# Patient Record
Sex: FEMALE | Race: WHITE | URBAN - NONMETROPOLITAN AREA
[De-identification: names, ages, dates, MRNs, and addresses within clinical notes are randomized per-mention and may not be internally consistent; named-entity substitution may affect disease eponyms.]

---

## 2021-09-08 ENCOUNTER — WEB ENCOUNTER (OUTPATIENT)
Dept: URBAN - NONMETROPOLITAN AREA CLINIC 13 | Facility: CLINIC | Age: 26
End: 2021-09-08

## 2021-09-08 ENCOUNTER — LAB OUTSIDE AN ENCOUNTER (OUTPATIENT)
Dept: URBAN - NONMETROPOLITAN AREA CLINIC 13 | Facility: CLINIC | Age: 26
End: 2021-09-08

## 2021-09-08 ENCOUNTER — OFFICE VISIT (OUTPATIENT)
Dept: URBAN - NONMETROPOLITAN AREA CLINIC 13 | Facility: CLINIC | Age: 26
End: 2021-09-08
Payer: COMMERCIAL

## 2021-09-08 DIAGNOSIS — R19.7 ACUTE DIARRHEA: ICD-10-CM

## 2021-09-08 DIAGNOSIS — L29.9 ITCHING: ICD-10-CM

## 2021-09-08 DIAGNOSIS — R10.84 DIFFUSE ABDOMINAL PAIN: ICD-10-CM

## 2021-09-08 PROCEDURE — 99244 OFF/OP CNSLTJ NEW/EST MOD 40: CPT | Performed by: INTERNAL MEDICINE

## 2021-09-08 RX ORDER — LEVOTHYROXINE SODIUM 200 UG/1
1 TABLET IN THE MORNING ON AN EMPTY STOMACH TABLET ORAL ONCE A DAY
Status: ACTIVE | COMMUNITY

## 2021-09-08 RX ORDER — SACCHAROMYCES BOULARDII 50 MG
1 CAPSULE CAPSULE ORAL TWICE A DAY
Qty: 60 | OUTPATIENT
Start: 2021-09-08 | End: 2021-10-08

## 2021-09-08 RX ORDER — IBUPROFEN 800 MG/1
TAKE 1 TABLET BY MOUTH TWICE DAILY AS NEEDED FOR PAIN TAKE WITH FOOD TABLET, FILM COATED ORAL
Qty: 30 | Refills: 2 | Status: ACTIVE | COMMUNITY

## 2021-09-08 RX ORDER — CETIRIZINE HYDROCHLORIDE 10 MG/1
TAKE 1 TABLET BY MOUTH ONCE DAILY TABLET ORAL
Qty: 30 | Refills: 0 | Status: ACTIVE | COMMUNITY

## 2021-09-08 RX ORDER — DICYCLOMINE HYDROCHLORIDE 10 MG/1
1 CAPSULE BEFORE MEALS PRN ABDOMINAL PAIN CAPSULE ORAL THREE TIMES A DAY
Qty: 90 CAPSULE | Refills: 3 | OUTPATIENT
Start: 2021-09-08 | End: 2022-01-05

## 2021-09-08 RX ORDER — SERTRALINE HYDROCHLORIDE 50 MG/1
1 TABLET TABLET, FILM COATED ORAL ONCE A DAY
Status: ACTIVE | COMMUNITY

## 2021-09-08 RX ORDER — AZELASTINE HYDROCHLORIDE AND FLUTICASONE PROPIONATE 137; 50 UG/1; UG/1
1 SPRAY IN EACH NOSTRIL SPRAY, METERED NASAL TWICE A DAY
Status: ACTIVE | COMMUNITY

## 2021-09-08 NOTE — HPI-TODAY'S VISIT:
9/8/21 Jackie Serrato is a 26 YO F referred by Dr. Neida Samayoa for chronic diarrhea. A copy of this document will be forwarded to the referring provider. She reports severe abdominal cramping and explosive diarrhea after almost every meal. No rectal bleeding or black stools. Stools are very loose to watery and typically yellow in color. Cramping resolves immediately with bowel movement. No fever or chills. No sick contacts. She had EGD and colonoscopy about 4 years ago that showed nothing concerning. These were done in [place]. She also describes itching all over and easy bruising for the last 6 weeks. She had similar symptoms with ITP during pregnancy that resolved about 4 months after having her son in May 2020. She had CMP and CBC at last visit with PCP that was normal. She is concerned this may be related to allergies and she is seeing allergist with ongoing workup.  She has history of thyroid cancer s/p thyroidectomy in 2017, now on synthroid. She had a lot of difficulty with her sedation and listed anesthesia as an allergy today. She has undergone multiple surgeries since that time. Most recently she had septoplasty with no issues post operatively but reports her plan for anesthesia was altered after she also had difficulty with gastric sleeve surgery prior to the septoplasty. She is nervous regarding sedation.  She has chronic back pain with significant disc disease and is seeing pain clinic locally. Currently she is taking ibuprofen twice daily. She is planned for an injection within the next few weeks. There is significant concern regarding multiple additional symptoms and possible autoimmune conditions. TG

## 2021-09-09 ENCOUNTER — TELEPHONE ENCOUNTER (OUTPATIENT)
Dept: URBAN - NONMETROPOLITAN AREA CLINIC 2 | Facility: CLINIC | Age: 26
End: 2021-09-09

## 2021-09-15 ENCOUNTER — OFFICE VISIT (OUTPATIENT)
Dept: URBAN - NONMETROPOLITAN AREA SURGERY CENTER 1 | Facility: SURGERY CENTER | Age: 26
End: 2021-09-15

## 2021-10-13 ENCOUNTER — OFFICE VISIT (OUTPATIENT)
Dept: URBAN - NONMETROPOLITAN AREA SURGERY CENTER 1 | Facility: SURGERY CENTER | Age: 26
End: 2021-10-13
Payer: COMMERCIAL

## 2021-10-13 PROCEDURE — 992 APS NON BILLABLE: Performed by: INTERNAL MEDICINE

## 2021-11-01 ENCOUNTER — OFFICE VISIT (OUTPATIENT)
Dept: URBAN - NONMETROPOLITAN AREA CLINIC 2 | Facility: CLINIC | Age: 26
End: 2021-11-01
Payer: COMMERCIAL

## 2021-11-01 ENCOUNTER — DASHBOARD ENCOUNTERS (OUTPATIENT)
Age: 26
End: 2021-11-01

## 2021-11-01 DIAGNOSIS — R10.84 DIFFUSE ABDOMINAL PAIN: ICD-10-CM

## 2021-11-01 DIAGNOSIS — K58.0 IRRITABLE BOWEL SYNDROME WITH DIARRHEA: ICD-10-CM

## 2021-11-01 PROBLEM — 197125005: Status: ACTIVE | Noted: 2021-11-01

## 2021-11-01 PROCEDURE — 99214 OFFICE O/P EST MOD 30 MIN: CPT | Performed by: NURSE PRACTITIONER

## 2021-11-01 RX ORDER — SERTRALINE HYDROCHLORIDE 50 MG/1
1 TABLET TABLET, FILM COATED ORAL ONCE A DAY
Status: ACTIVE | COMMUNITY

## 2021-11-01 RX ORDER — IBUPROFEN 800 MG/1
TAKE 1 TABLET BY MOUTH TWICE DAILY AS NEEDED FOR PAIN TAKE WITH FOOD TABLET, FILM COATED ORAL
Qty: 30 | Refills: 2 | Status: ACTIVE | COMMUNITY

## 2021-11-01 RX ORDER — LEVOTHYROXINE SODIUM 200 UG/1
1 TABLET IN THE MORNING ON AN EMPTY STOMACH TABLET ORAL ONCE A DAY
Status: ACTIVE | COMMUNITY

## 2021-11-01 RX ORDER — AZELASTINE HYDROCHLORIDE AND FLUTICASONE PROPIONATE 137; 50 UG/1; UG/1
1 SPRAY IN EACH NOSTRIL SPRAY, METERED NASAL TWICE A DAY
Status: DISCONTINUED | COMMUNITY

## 2021-11-01 RX ORDER — RIFAXIMIN 550 MG/1
1 TABLET TABLET ORAL THREE TIMES A DAY
Qty: 42 TABLET | Refills: 2 | OUTPATIENT
Start: 2021-11-01 | End: 2021-12-12

## 2021-11-01 RX ORDER — CETIRIZINE HYDROCHLORIDE 10 MG/1
TAKE 1 TABLET BY MOUTH ONCE DAILY TABLET ORAL
Qty: 30 | Refills: 0 | Status: DISCONTINUED | COMMUNITY

## 2021-11-01 RX ORDER — DICYCLOMINE HYDROCHLORIDE 10 MG/1
1 CAPSULE BEFORE MEALS PRN ABDOMINAL PAIN CAPSULE ORAL THREE TIMES A DAY
Qty: 90 CAPSULE | Refills: 3 | Status: DISCONTINUED | COMMUNITY
Start: 2021-09-08 | End: 2022-01-05

## 2021-11-01 NOTE — HPI-TODAY'S VISIT:
9/8/21 Jackie Serrato is a 26 YO F referred by Dr. Neida Samayoa for chronic diarrhea. A copy of this document will be forwarded to the referring provider. She reports severe abdominal cramping and explosive diarrhea after almost every meal. No rectal bleeding or black stools. Stools are very loose to watery and typically yellow in color. Cramping resolves immediately with bowel movement. No fever or chills. No sick contacts. She had EGD and colonoscopy about 4 years ago that showed nothing concerning. These were done in [place]. She also describes itching all over and easy bruising for the last 6 weeks. She had similar symptoms with ITP during pregnancy that resolved about 4 months after having her son in May 2020. She had CMP and CBC at last visit with PCP that was normal. She is concerned this may be related to allergies and she is seeing allergist with ongoing workup.  She has history of thyroid cancer s/p thyroidectomy in 2017, now on synthroid. She had a lot of difficulty with her sedation and listed anesthesia as an allergy today. She has undergone multiple surgeries since that time. Most recently she had septoplasty with no issues post operatively but reports her plan for anesthesia was altered after she also had difficulty with gastric sleeve surgery prior to the septoplasty. She is nervous regarding sedation.  She has chronic back pain with significant disc disease and is seeing pain clinic locally. Currently she is taking ibuprofen twice daily. She is planned for an injection within the next few weeks. There is significant concern regarding multiple additional symptoms and possible autoimmune conditions. TG  11/1/21 Jackie presents for follow up after EGD/colon on 10/13/21. Procedures had to be done at the hospital due to difficulty in getting IV. She was stuck a total of 9 times. EGD revealed gastritis o/w normal appearing mucosa with histological findings of esophagitis, moderate gastritis, and mild duodenitis. Colonoscopy was normal with random colon bx c/w nonspecific colonic inflammation.  Her diarrhea is a bit better since her last appointment. She has tried the fiber, florastor, dicyclomine. Her stools are not always watery at this point but she continues with multiple stools daily that are loose and urgent. No rectal bleeding. Continues with abdominal cramping however this is better with the dicyclomine.  She continues with severe back pain and has a follow up with Dr. Baird, her neurosurgeon on Wednesday. Since her last visit her gabapentin has been increased to 300mg TID but she can only tolerate BID dosing due to drowsiness. She also is taking Robaxin and has decreased her ibuprofen 800mg to about 3 times a week rather than BID. TG

## 2022-02-07 ENCOUNTER — OFFICE VISIT (OUTPATIENT)
Dept: URBAN - NONMETROPOLITAN AREA CLINIC 2 | Facility: CLINIC | Age: 27
End: 2022-02-07